# Patient Record
Sex: MALE | Race: WHITE
[De-identification: names, ages, dates, MRNs, and addresses within clinical notes are randomized per-mention and may not be internally consistent; named-entity substitution may affect disease eponyms.]

---

## 2017-07-07 ENCOUNTER — HOSPITAL ENCOUNTER (EMERGENCY)
Dept: HOSPITAL 10 - FTE | Age: 47
Discharge: HOME | End: 2017-07-07
Payer: COMMERCIAL

## 2017-07-07 VITALS
BODY MASS INDEX: 28.55 KG/M2 | HEIGHT: 67 IN | WEIGHT: 181.88 LBS | BODY MASS INDEX: 28.55 KG/M2 | HEIGHT: 67 IN | WEIGHT: 181.88 LBS

## 2017-07-07 VITALS
SYSTOLIC BLOOD PRESSURE: 112 MMHG | DIASTOLIC BLOOD PRESSURE: 76 MMHG | TEMPERATURE: 99.5 F | RESPIRATION RATE: 20 BRPM | HEART RATE: 89 BPM

## 2017-07-07 DIAGNOSIS — Z87.891: ICD-10-CM

## 2017-07-07 DIAGNOSIS — R50.9: Primary | ICD-10-CM

## 2017-07-07 DIAGNOSIS — J02.0: ICD-10-CM

## 2017-07-07 LAB
ADD SCAN DIFF: NO
ANION GAP SERPL CALC-SCNC: 19 MMOL/L (ref 8–16)
BASOPHILS # BLD AUTO: 0.1 10^3/UL (ref 0–0.1)
BASOPHILS NFR BLD: 0.5 % (ref 0–2)
BUN SERPL-MCNC: 9 MG/DL (ref 7–20)
CALCIUM SERPL-MCNC: 9.4 MG/DL (ref 8.4–10.2)
CHLORIDE SERPL-SCNC: 101 MMOL/L (ref 97–110)
CO2 SERPL-SCNC: 24 MMOL/L (ref 21–31)
CREAT SERPL-MCNC: 1.24 MG/DL (ref 0.61–1.24)
EOSINOPHIL # BLD: 0 10^3/UL (ref 0–0.5)
EOSINOPHIL NFR BLD: 0.1 % (ref 0–7)
ERYTHROCYTE [DISTWIDTH] IN BLOOD BY AUTOMATED COUNT: 13.1 % (ref 11.5–14.5)
GLUCOSE SERPL-MCNC: 107 MG/DL (ref 70–220)
HCT VFR BLD CALC: 45.8 % (ref 42–52)
HGB BLD-MCNC: 15.2 G/DL (ref 14–18)
LYMPHOCYTES # BLD AUTO: 1.3 10^3/UL (ref 0.8–2.9)
LYMPHOCYTES NFR BLD AUTO: 10 % (ref 15–51)
MCH RBC QN AUTO: 30.2 PG (ref 29–33)
MCHC RBC AUTO-ENTMCNC: 33.2 G/DL (ref 32–37)
MCV RBC AUTO: 90.9 FL (ref 82–101)
MONOCYTES # BLD: 1.2 10^3/UL (ref 0.3–0.9)
MONOCYTES NFR BLD: 9.1 % (ref 0–11)
NEUTROPHILS # BLD: 10.5 10^3/UL (ref 1.6–7.5)
NEUTROPHILS NFR BLD AUTO: 79.6 % (ref 39–77)
NRBC # BLD MANUAL: 0 10^3/UL (ref 0–0)
NRBC BLD QL: 0 /100WBC (ref 0–0)
PLATELET # BLD: 234 10^3/UL (ref 140–415)
PMV BLD AUTO: 8.9 FL (ref 7.4–10.4)
POTASSIUM SERPL-SCNC: 3.9 MMOL/L (ref 3.5–5.1)
RBC # BLD AUTO: 5.04 10^6/UL (ref 4.7–6.1)
SODIUM SERPL-SCNC: 140 MMOL/L (ref 135–144)
WBC # BLD AUTO: 13.1 10^3/UL (ref 4.8–10.8)

## 2017-07-07 PROCEDURE — 80048 BASIC METABOLIC PNL TOTAL CA: CPT

## 2017-07-07 PROCEDURE — 85025 COMPLETE CBC W/AUTO DIFF WBC: CPT

## 2017-07-07 PROCEDURE — 99284 EMERGENCY DEPT VISIT MOD MDM: CPT

## 2017-07-07 PROCEDURE — 71010: CPT

## 2017-07-07 PROCEDURE — 83690 ASSAY OF LIPASE: CPT

## 2017-07-07 PROCEDURE — 83605 ASSAY OF LACTIC ACID: CPT

## 2017-07-07 NOTE — ERD
ER Documentation


Chief Complaint


Date/Time


DATE: 7/7/17 


TIME: 06:20


Chief Complaint


fever/body aches since yesterday





HPI


Patient is a 47-year-old male who presents to the emergency department for 

concerns of a fever and body aches since yesterday.  Patient states he was in 

[place] prior to the start of the symptoms. Patient reports tactile fevers 

and chills. Patient last took Ibuprofen at 8am yesterday. Patient does report 

drinking heavily.  Patient denies any vomiting, abdominal pain or diarrhea.  

Patient denies any dysuria or flank pain.  Patient does report sore throat.  

Patient denies any trismus, drooling or hyperextension of his neck.  Patient 

denies any ear pain, rhinorrhea.  Patient does have a dry cough.  No sick 

contacts.  +Recent travel, [place].





ROS


All systems reviewed and are negative except as per history of present illness.





Medications


Home Meds


Active Scripts


Acetaminophen* (Tylophen*) 500 Mg Capsule, 2 CAP PO Q4H Y for PAIN AND OR 

ELEVATED TEMP, #20 CAP


   Prov:EVERARDO DUEÑAS PA-C         7/7/17


Ibuprofen* (Motrin*) 600 Mg Tab, 600 MG PO Q6, #30 TAB


   Prov:EVERARDO DUEÑAS PA-C         7/7/17


Amoxicillin* (Amoxicillin*) 500 Mg Cap, 500 MG PO BID for 10 Days, CAP


   Prov:EVERARDO DUEÑAS PA-C         7/7/17


Amoxicillin Trihydrate (Amoxicillin) 500 Mg Tablet, 500 MG PO BID, #20 TAB


   Prov:ADAM AYALA NP         9/10/16


Guaifenesin/Dextromethorphan (Q-Tussin Dm Syrup) 473 Ml Syrup, 10 ML PO Q8 Y 

for COUGH, #1 BOTTLE


   Prov:ADAM AYALA NP         9/10/16





Allergies


Allergies:  


Coded Allergies:  


     No Known Drug Allergies (Verified  Allergy, Unknown, 7/7/17)





PMhx/Soc


Medical and Surgical Hx:  pt denies Medical Hx, pt denies Surgical Hx


Hx Alcohol Use:  Yes


Hx Substance Use:  No


Hx Tobacco Use:  Yes


Smoking Status:  Former smoker





FmHx


Family History:  No diabetes





Physical Exam


Vitals





Vital Signs








  Date Time  Temp Pulse Resp B/P Pulse Ox O2 Delivery O2 Flow Rate FiO2


 


7/7/17 07:42 98.7 90 18 112/76 96 Room Air  


 


7/7/17 05:25 104.3 118 20 130/78 96   








Physical Exam


GENERAL: Well-developed, well-nourished male. Appears in no acute distress.  

Begin full sentences.  No signs of respiratory distress including abdominal 

retractions, nasal flaring or tripoding.


HEAD: Normocephalic, atraumatic. No deformities or ecchymosis. 


EYE: Pupils equal, round, and reactive to light. EOMs intact. No conjunctival 

erythema. No eye discharge. 


ENT: External ear without any masses or tenderness. TM visualized bilaterally, 

non-erythematous, non-bulging. Nasal mucosa pink with no discharge. Oropharynx 

is erythematous with bilateral tonsillar swelling.  Few exudates noted on the 

patient's upper right tonsil. No uvula deviation. No kissing tonsils. 


NECK: Supple. No meningismus. Normal ROM of the neck.


LUNG: Clear to auscultation bilaterally. No rhonchi, wheezing, rales or coarse 

breath sounds. 


HEART: Regular rate and rhythm. No murmurs, rubs or gallops.


ABDOMEN: Soft, nontender, and nondistended. Positive bowel sounds in all four 

quadrants. No rebound tenderness, no guarding. (-) McBurney's point tenderness.

  No CVA tenderness.


BACK: No midline tenderness. 


EXTREMITES: Equal pulses bilaterally. No peripheral clubbing, cyanosis or 

edema. No unilateral leg swelling.  


NEUROLOGIC: Alert and oriented to person, place and time. Moving all four 

extremities. 5/5 strength in all extremities. Normal speech. Steady gait. (-) 

Brudzinski sign. (-) Kernig's sign. 


SKIN: Normal color. Warm and dry. No rashes or lesions.


Result Diagram:  


7/7/17 0545                                                                    

            7/7/17 0545





Results 24 hrs





 Laboratory Tests








Test


  7/7/17


05:45


 


White Blood Count 13.110^3/ul 


 


Red Blood Count 5.0410^6/ul 


 


Hemoglobin 15.2g/dl 


 


Hematocrit 45.8% 


 


Mean Corpuscular Volume 90.9fl 


 


Mean Corpuscular Hemoglobin 30.2pg 


 


Mean Corpuscular Hemoglobin


Concent 33.2g/dl 


 


 


Red Cell Distribution Width 13.1% 


 


Platelet Count 85706^3/UL 


 


Mean Platelet Volume 8.9fl 


 


Neutrophils % 79.6% 


 


Lymphocytes % 10.0% 


 


Monocytes % 9.1% 


 


Eosinophils % 0.1% 


 


Basophils % 0.5% 


 


Nucleated Red Blood Cells % 0.0/100WBC 


 


Neutrophils # 10.510^3/ul 


 


Lymphocytes # 1.310^3/ul 


 


Monocytes # 1.210^3/ul 


 


Eosinophils # 0.010^3/ul 


 


Basophils # 0.110^3/ul 


 


Nucleated Red Blood Cells # 0.010^3/ul 


 


Sodium Level 140mmol/L 


 


Potassium Level 3.9mmol/L 


 


Chloride Level 101mmol/L 


 


Carbon Dioxide Level 24mmol/L 


 


Anion Gap 19 


 


Blood Urea Nitrogen 9mg/dl 


 


Creatinine 1.24mg/dl 


 


Glucose Level 107mg/dl 


 


Lactic Acid Level 1.3mmol/L 


 


Calcium Level 9.4mg/dl 


 


Lipase 58U/L 








 Current Medications








 Medications


  (Trade)  Dose


 Ordered  Sig/Claudia


 Route


 PRN Reason  Start Time


 Stop Time Status Last Admin


Dose Admin


 


 Acetaminophen


  (Tylenol Tab)  500 mg  ONCE  STAT


 PO


   7/7/17 05:43


 7/7/17 05:45 DC 7/7/17 05:49


 


 


 Ibuprofen 400 mg  400 mg  ONCE  ONCE


 PO


   7/7/17 06:00


 7/7/17 06:01 DC 7/7/17 05:49


 


 


 Sodium Chloride


  (NS)  1,000 ml @ 


 1,000 mls/hr  Q1H ONCE


 IV


   7/7/17 06:00


 7/7/17 06:59 DC 7/7/17 05:52


 











Procedures/MDM


MEDICAL DECISION MAKING:


This is a 47-year-old male who presents to the emergency department with a fever

, body aches in the sore throat 1 day. Vital signs were reviewed. Patient was 

febrile was at initial presentation with 104.3 Fahrenheit temperature.  Patient'

s pulse was noted to be 118 bpm.  Patient was given antipyretics here in the 

emergency department.  Patient was also given fluids.. Patient's temperature 

was noted to be down trending.  Prior discharge patient's temperature was noted 

to be 98.7 Fahrenheit. Patient's pulse was noted to be 90 bpm prior to 

discharge. Patient was not hypoxic. ENT exam revealed bilateral tonsillar 

erythema with a few exudates on the R tonsil.  Patient had no meningeal signs.  

Patient's lung exam was unremarkable.  Patient's abdominal exam was 

unremarkable.   CBC showed no evidence of severe anemia.  Patient was noted to 

have a white count of 13.1.  Lactate was within normal limits.  CMP showed no 

evidence of electrolyte abnormalities, severe acidosis, alkalosis, renal failure

, or liver disease. Lipase showed no evidence of acute pancreatitis. Chest x-

ray was unremarkable.  Given these findings, the patient's presentation is most 

consistent with presumed strep pharyngitis. I have a much lower clinical 

concern for bacterial infections including pneumonia, meningitis, sinusitis, 

otitis externa, acute otitis media, epiglottitis or peritonsillar abscess.  At 

this time, I doubt sepsis.





PRESCRIPTIONS:


Amoxicillin, ibuprofen, Tylenol





DISCHARGE:


At this time, patient is stable for discharge and outpatient management.  

Patient was given a copy of all blood work and imaging studies obtained today.  

Supportive therapies such as OTC throat lozenges, salt water gurgles, popsicles 

and jello discussed. I have instructed the patient to follow-up with his/her 

primary care physician in 1-2 days. I have instructed the patient to promptly 

return to the ER for any new or worsening symptoms including increased pain, 

swelling, fever, nausea, vomiting, weakness or difficulty breathing. The 

patient and/or family expressed understanding of and agreement with this plan. 

All questions were answered. Home care instructions were provided.





Departure


Diagnosis:  


 Primary Impression:  


 Fever


 Fever type:  unspecified  Qualified Code:  R50.9 - Fever, unspecified fever 

cause


 Additional Impression:  


 Strep pharyngitis


Condition:  Stable


Patient Instructions:  Fever Control (Adult)


Referrals:  


[facility]





Additional Instructions:  


Call your primary care doctor TOMORROW for an appointment during the next 1-2 

days.See the doctor sooner or return here if your condition worsens before your 

appointment time.





Take ibuprofen every 6 hours.  Take Tylenol every 4 hours.  Plenty of fluids.  

Stay hydrated.











EVERARDO DUEÑAS PA-C Jul 7, 2017 06:21

## 2017-07-07 NOTE — RADRPT
PROCEDURE:   Chest. 

 

CLINICAL INDICATION:    Fever and cough. 

 

TECHNIQUE:   Single frontal view of the chest was obtained. 

 

COMPARISON:   None. 

 

FINDINGS:

The cardiac silhouette is within normal limits.  The aortic arch is unremarkable.  There is no focal
 consolidation, vascular congestion or pleural effusion.  There is no pneumothorax. 

 

IMPRESSION:

No evidence for active cardiopulmonary disease.

_____________________________________________ 

.Raymond Wilkins MD, MD           Date    Time 

Electronically viewed and signed by .Raymond Wilkins MD, MD on 07/07/2017 07:08 

 

D:  07/07/2017 07:08  T:  07/07/2017 07:08

.T/

## 2019-01-05 ENCOUNTER — HOSPITAL ENCOUNTER (EMERGENCY)
Dept: HOSPITAL 91 - FTE | Age: 49
Discharge: HOME | End: 2019-01-05
Payer: COMMERCIAL

## 2019-01-05 ENCOUNTER — HOSPITAL ENCOUNTER (EMERGENCY)
Dept: HOSPITAL 10 - FTE | Age: 49
Discharge: HOME | End: 2019-01-05
Payer: COMMERCIAL

## 2019-01-05 VITALS
BODY MASS INDEX: 28.88 KG/M2 | HEIGHT: 66 IN | WEIGHT: 179.68 LBS | WEIGHT: 179.68 LBS | BODY MASS INDEX: 28.88 KG/M2 | HEIGHT: 66 IN

## 2019-01-05 VITALS — SYSTOLIC BLOOD PRESSURE: 129 MMHG | DIASTOLIC BLOOD PRESSURE: 81 MMHG | HEART RATE: 80 BPM | RESPIRATION RATE: 20 BRPM

## 2019-01-05 DIAGNOSIS — R05: Primary | ICD-10-CM

## 2019-01-05 PROCEDURE — 99283 EMERGENCY DEPT VISIT LOW MDM: CPT

## 2019-01-05 RX ADMIN — ACETAMINOPHEN 1 MG: 325 TABLET, FILM COATED ORAL at 11:18

## 2019-01-05 NOTE — ERD
ER Documentation


Chief Complaint


Chief Complaint





cough/congestion x 2 days; sore throat x 3 days. cold symptoms





HPI


48-year-old male, previously healthy, presents to the emergency department, 


complaining of 3 days with upper respiratory symptoms including sore throat, 


cough and congestion.  During the last 2 days the symptoms have been getting 


worse, associated with subjective fever and general malaise.  No medications 


taken at this time.





ROS


All systems reviewed and are negative except as per history of present illness.





Medications


Home Meds


Active Scripts


Guaifenesin-Codeine Phosphate* (Guaifenesin* AC Cough Syrup) 473 Ml Liquid, 5 ML


PO QHS PRN for COUGH, #60 ML


   Prov:KATHARINE HANEY MD         1/5/19


Ibuprofen* (Motrin*) 400 Mg Tab, 400 MG PO Q8, #12 TAB


   Prov:KATHARINE HANEY MD         1/5/19


Albuterol Sulfate* (Proair HFA*) 8.5 Gm Hfa.aer.ad, 2 PUFF INH Q4H PRN for 


WHEEZING AND SOB, #1 INHALER


   Prov:KATHARINE HANEY MD         1/5/19


Amoxicillin* (Amoxicillin*) 500 Mg Cap, 500 MG PO TID for 7 Days, CAP


   Prov:KATHARINE HANEY MD         1/5/19


Acetaminophen* (Tylophen*) 500 Mg Capsule, 2 CAP PO Q4H PRN for PAIN AND OR 


ELEVATED TEMP, #20 CAP


   Prov:EVERARDO DUEÑAS PA-C         7/7/17


Ibuprofen* (Motrin*) 600 Mg Tab, 600 MG PO Q6, #30 TAB


   Prov:EVERARDO DUEÑAS PA-C         7/7/17


Amoxicillin* (Amoxicillin*) 500 Mg Cap, 500 MG PO BID for 10 Days, CAP


   Prov:EVERARDO DUEÑASC         7/7/17


Amoxicillin Trihydrate (Amoxicillin) 500 Mg Tablet, 500 MG PO BID, #20 TAB


   Prov:ADAM AYALA NP         9/10/16


Guaifenesin/Dextromethorphan (Q-Tussin Dm Syrup) 473 Ml Syrup, 10 ML PO Q8 PRN 


for COUGH, #1 BOTTLE


   Prov:ADAM AYALA NP         9/10/16





Allergies


Allergies:  


Coded Allergies:  


     No Known Drug Allergies (Verified  Allergy, Unknown, 7/7/17)





PMhx/Soc


Medical and Surgical Hx:  pt denies Medical Hx, pt denies Surgical Hx


Hx Alcohol Use:  Yes (socially)


Hx Substance Use:  No


Hx Tobacco Use:  No


Smoking Status:  Never smoker





Physical Exam


Vitals





Vital Signs


  Date      Temp  Pulse  Resp  B/P (MAP)   Pulse Ox  O2          O2 Flow    FiO2


Time                                                 Delivery    Rate


    1/5/19  99.1     80    20      129/81        97


     10:37                           (97)





Physical Exam


Const:   No acute distress


Head:   Atraumatic 


Eyes:    Normal Conjunctiva


ENT:    Normal External Ears, Nose and Mouth.


Neck:               Full range of motion. No meningismus.


Resp:   Clear to auscultation bilaterally


Cardio:   Regular rate and rhythm, no murmurs


Abd:    Soft, non tender, non distended. Normal bowel sounds


Skin:   No petechiae or rashes


Back:   No midline or flank tenderness


Ext:    No cyanosis, or edema


Neur:   Awake and alert


Psych:    Normal Mood and Affect


Results 24 hrs





Current Medications


 Medications
   Dose
          Sig/Claudia
       Start Time
   Status  Last


 (Trade)       Ordered        Route
 PRN     Stop Time              Admin
Dose


                              Reason                                Admin


                650 mg         ONCE  ONCE
    1/5/19        DC            1/5/19


Acetaminophen                 PO
            11:30
 1/5/19                11:18




  (Tylenol                                  11:31


Tab)








Procedures/MDM


Vital signs stable, no respiratory distress.


Differential diagnosis include but not limited to: Respiratory infection 


bacterial/viral/fungal.  Croup, bronchiolitis, pneumonitis, allergies, GERD.  


Less likely foreign body aspiration, cardiac related.


Physical examination and clinical presentation consistent most likely with viral


infection with early superimposed bacterial infection.


During the ED course the patient remained stable, no new complaints. 


Treatment options and clinical impression discussed with mother who agrees with 


management. The patient is stable to be treated outpatient and will be 


discharged home with a Rx for amoxicillin, pro-air and ibuprofen.


Some side effects of prescribed medications (headache, rash, nausea, vomiting, 


diarrhea, interactions with other medications) were reviewed.





The patient needs to follow up with the primary care provider in the next 48h.  


If symptoms persist, worsen or new symptoms develop, then patient should return 


to the ED immediately.





Disclaimer: Inadvertent spelling and grammatical errors are likely due to EHR/d


ictation software use and do not reflect on the overall quality of patient care.


Also, please note that the electronic time recorded on this note does not 


necessarily reflect the actual time of the patient encounter.





Departure


Diagnosis:  


   Primary Impression:  


   Cough


Condition:  Stable





Additional Instructions:  


Thank you very much for allowing us to participate in your care. 


Your health and safety is our top priority at [facility].





Call your primary care doctor TOMORROW for an appointment during the next 2-4 


days and bring all the information and medications prescribed. 





Have prescriptions filled and follow precisely the directions on the label. 





If the symptoms get worse and your provider is unavailable, return to the 


Emergency Department immediately.











KATHARINE HANEY MD       Jan 5, 2019 11:10